# Patient Record
(demographics unavailable — no encounter records)

---

## 2024-11-17 NOTE — PHYSICAL EXAM
[Normal] : abdomen normal bowel sounds, soft, non-tender, non distended and no hepatosplenomegaly [de-identified] : no LLE [de-identified] : Neuro: grossly intact

## 2024-11-17 NOTE — HISTORY OF PRESENT ILLNESS
[de-identified] : 21 year old female with malnutrition for f/u. Last seen in Sept 2023.   Went back to [employer] for second semester in early 2024 and went back to school this Fall to start Senior year but then came home in Sept to continue with ED treatment. Has been seeing therapist. Has been maintaining but feels she's still having a hard time with her eating disorder and ready to pursue a higher level of care. Last binged yesterday. Not obsessively exercising. Last purged last week. Not puring more often than once a week. Will be starting program in [place] and needs labs. ([facility] in [place]). Plan is for admission on Friday. Needs CBC and CMP, Mg, Phos but otherwise is medically cleared per program.   Ran [facility] [place] two weeks ago.  Saw PMD earlier this summer for annual check up.  [de-identified] : regular monthly periods [de-identified] : current

## 2024-11-17 NOTE — ASSESSMENT
[FreeTextEntry1] : 21 year old female with malnutrition. Plans to be admitted to residential eating disorder program later this week. Will send labs requested. Mom participated in visit on phone. Patient feels ready to move past ED. Discussed can follow-up when home.

## 2024-11-17 NOTE — HISTORY OF PRESENT ILLNESS
[de-identified] : 21 year old female with malnutrition for f/u. Last seen in Sept 2023.   Went back to [employer] for second semester in early 2024 and went back to school this Fall to start Senior year but then came home in Sept to continue with ED treatment. Has been seeing therapist. Has been maintaining but feels she's still having a hard time with her eating disorder and ready to pursue a higher level of care. Last binged yesterday. Not obsessively exercising. Last purged last week. Not puring more often than once a week. Will be starting program in [place] and needs labs. ([facility] in [place]). Plan is for admission on Friday. Needs CBC and CMP, Mg, Phos but otherwise is medically cleared per program.   Ran [facility] two weeks ago.  Saw PMD earlier this summer for annual check up.  [de-identified] : regular monthly periods [de-identified] : current

## 2024-11-17 NOTE — PHYSICAL EXAM
[Normal] : abdomen normal bowel sounds, soft, non-tender, non distended and no hepatosplenomegaly [de-identified] : no LLE [de-identified] : Neuro: grossly intact

## 2025-01-23 NOTE — ASSESSMENT
[FreeTextEntry1] : 21 year old female with malnutrition. Now in outpatient care following residential and PHP. No purging in two months. Will repeat CMP to check alk phos although notes had normal labs at residential. Mom participated in visit on phone. RTC 1 month.

## 2025-01-23 NOTE — PHYSICAL EXAM
[Normal] : abdomen normal bowel sounds, soft, non-tender, non distended and no hepatosplenomegaly [de-identified] : no LLE [de-identified] : Neuro: grossly intact

## 2025-01-23 NOTE — HISTORY OF PRESENT ILLNESS
[de-identified] : 21 year old female with malnutrition.   Last purged November 14 which was day prior to going to residential. Found residential very helpful. Stayed there for a month. Went to [facility] earlier this month for two weeks but did not find it helpful so self-discharged. Has appt with new therapist tomorrow. Later today will have first appt with RD. No longer on prozac. Mom would like patient to see a new psychiatrist so had an intake call.  Will be transferring credits to start college locally this summer or Fall.  [de-identified] : early Hao